# Patient Record
Sex: FEMALE | Race: WHITE
[De-identification: names, ages, dates, MRNs, and addresses within clinical notes are randomized per-mention and may not be internally consistent; named-entity substitution may affect disease eponyms.]

---

## 2018-10-18 ENCOUNTER — HOSPITAL ENCOUNTER (EMERGENCY)
Dept: HOSPITAL 44 - ED | Age: 38
Discharge: ANOTHER HOSPITAL | End: 2018-10-18
Payer: COMMERCIAL

## 2018-10-18 DIAGNOSIS — N92.4: Primary | ICD-10-CM

## 2018-10-18 LAB
BASOPHILS NFR BLD: 0.4 % (ref 0–1.5)
EGFR (NON-AFRICAN): > 60
EOSINOPHIL NFR BLD: 1.9 % (ref 0–6.8)
MCH RBC QN AUTO: 19.6 PG (ref 28–34)
MCV RBC AUTO: 64 FL (ref 80–100)
MONOCYTES %: 6.9 % (ref 0–11)
NEUTROPHILS #: 6.8 # K/UL (ref 1.4–7.7)

## 2018-10-18 PROCEDURE — S1016 NON-PVC INTRAVENOUS ADMINIST: HCPCS

## 2018-10-18 PROCEDURE — 85610 PROTHROMBIN TIME: CPT

## 2018-10-18 PROCEDURE — 96376 TX/PRO/DX INJ SAME DRUG ADON: CPT

## 2018-10-18 PROCEDURE — 84703 CHORIONIC GONADOTROPIN ASSAY: CPT

## 2018-10-18 PROCEDURE — 85730 THROMBOPLASTIN TIME PARTIAL: CPT

## 2018-10-18 PROCEDURE — 96365 THER/PROPH/DIAG IV INF INIT: CPT

## 2018-10-18 PROCEDURE — 96375 TX/PRO/DX INJ NEW DRUG ADDON: CPT

## 2018-10-18 PROCEDURE — 85025 COMPLETE CBC W/AUTO DIFF WBC: CPT

## 2018-10-18 PROCEDURE — 80053 COMPREHEN METABOLIC PANEL: CPT

## 2018-10-18 NOTE — ED PHYSICIAN DOCUMENTATION
Female Urogenital Problems





- HISTORIAN


Historian: patient





- HPI


Stated Complaint: Vaginal bleeding


Chief Complaint: Female Urogenital Problems


Onset: hours (worse past 4 hours), days ago


Severity: severe


Further Comments: yes (38 year old female patient presents with complaint of 

vaginal bleeding, patient reports passing handful clots PTA, has changed her 4oz

menstrual cup 10-12 times since .  C/O dizziness when standing.)





- Vaginal Bleeding


Abnormal Bleeding Started: 10/18/18


Compared to Menstrual Periods: heavier


LNMP (Last Known Menstrual Period): 10/08/18


: 1


Para: 1


Contraceptive: none





- ROS


CONST: none


GI/: nausea.  denies: vomiting, diarrhea


CVS/RESP: none


EYES/ENT: none


NEURO/PSYCH: none


MS/SKIN/LYMPH: none





- PAST HX


Past History: endometriosis


Allergies/Adverse Reactions: 


                                    Allergies











Allergy/AdvReac Type Severity Reaction Status Date / Time


 


No Known Allergies Allergy   Unverified 10/18/18 21:48














Home Medications: 


                                Ambulatory Orders











 Medication  Instructions  Recorded


 


NK  10/18/18














- SOCIAL HX


Smoking History: non-smoker





- FAMILY HX


Family History: denies: none





- VITAL SIGNS


Vital Signs: 





                                   Vital Signs











Temp Pulse Resp BP Pulse Ox


 


 98.6 F   93 H  18   124/69   100 


 


 10/18/18 20:20  10/18/18 20:20  10/18/18 20:20  10/18/18 20:20  10/18/18 20:20














- REVIEWED ASSESSMENTS


Nursing Assessment  Reviewed: Yes


Vitals Reviewed: Yes





Progress





- Consult/PCP


Time Called: 23:35


Consult/PCP: OB/GYN


Consult Reason/Comments: Patient accepted by Dr Morales





ED Results Lab/Radiology





- Lab Results


Lab Results: 





                                   Lab Results











  10/18/18 10/18/18 10/18/18





  22:40 21:47 21:47


 


WBC      10.70 K/ul K/ul





     (4.00-12.00) 


 


RBC      3.83 M/ul L M/ul





     (3.90-5.20) 


 


Hgb      7.5 g/dL L g/dL





     (12.0-16.0) 


 


Hct      24.4 % L %





     (34.5-46.5) 


 


MCV      64.0 fl L fl





     (80.0-100.0) 


 


MCH      19.6 pg L pg





     (28.0-34.0) 


 


MCHC      30.8 g/dL g/dL





     (30.0-36.0) 


 


RDW      18.1 % H %





     (11.3-14.3) 


 


Plt Count      433 K/mm3 H K/mm3





     (130-400) 


 


Neut % (Auto)      63.0 % %





     (39.0-79.0) 


 


Lymph % (Auto)      27.8 % %





     (16.0-50.0) 


 


Mono % (Auto)      6.9 % %





     (0.0-11.0) 


 


Eos % (Auto)      1.9 % %





     (0.0-6.8) 


 


Baso % (Auto)      0.4 





     (0.0-1.5) 


 


Neut # (Auto)      6.8 # k/uL # k/uL





     (1.4-7.7) 


 


Lymph # (Auto)      3.0 # k/uL # k/uL





     (0.6-4.0) 


 


Mono # (Auto)      0.7 # k/uL # k/uL





     (0.0-0.9) 


 


Eos # (Auto)      0.2 # k/uL # k/uL





     (0.0-0.6) 


 


Baso # (Auto)      0.0 # k/uL # k/uL





     (0.0-0.5) 


 


Sodium    138 mmol/L mmol/L  





    (136-145)  


 


Potassium    3.9 mmol/L mmol/L  





    (3.5-5.1)  


 


Chloride    104 mmol/L mmol/L  





    ()  


 


Carbon Dioxide    22 mmol/L mmol/L  





    (22-30)  


 


BUN    15 mg/dL mg/dL  





    (7-17)  


 


Creatinine    0.70 mg/dL mg/dL  





    (0.52-1.04)  


 


Estimated Creat Clear    190   





    


 


Est GFR ( Amer)    > 60   





   (60 - ) 


 


Est GFR (Non-Af Amer)    > 60   





   (60 - ) 


 


Glucose    99 mg/dL mg/dL  





    ()  


 


Calcium    8.1 mg/dL L mg/dL  





    (8.4-10.2)  


 


Total Bilirubin    0.4 mg/dL mg/dL  





    (0.2-1.3)  


 


AST    39 U/L U/L  





    (15-46)  


 


ALT    39 U/L U/L  





    (13-69)  


 


Alkaline Phosphatase    43 U/L U/L  





    ()  


 


Total Protein    7.0 g/dL g/dL  





    (6.3-8.2)  


 


Albumin    3.8 g/dL g/dL  





    (3.5-5.0)  


 


Serum HCG, Qual  Negative     





   (NEGATIVE)   














- Orders


Orders: 





                                    ED Orders











 Category Date Time Status


 


 Place IV Lock 1T Care  10/18/18 20:56 Active


 


 CBC/PLATELET/DIFF Stat Lab  10/18/18 21:47 Completed


 


 CMP Stat Lab  10/18/18 21:47 Completed


 


 PT-INR Stat Lab  10/18/18 23:01 Received


 


 PTT Stat Lab  10/18/18 23:01 Received


 


 SERUM HCG Stat Lab  10/18/18 22:40 Completed


 


 0.9 % Sodium Chloride [Normal Saline] 1,000 ml Med  10/18/18 20:56 Discontinued





 IV NOW   


 


 0.9 % Sodium Chloride [Normal Saline] 1,000 ml Med  10/18/18 23:31 Ordered





 IV NOW   


 


 Ondansetron HCl/Pf [Zofran 4 mg/2 ml] Med  10/18/18 21:47 Discontinued





 4 mg IVP NOW ONE   


 


 Ondansetron HCl/Pf [Zofran 4 mg/2 ml] Med  10/18/18 23:31 Once





 4 mg IVP NOW ONE   


 


 Promethazine HCl [Phenergan] Med  10/18/18 23:30 Stop Req





 25 mg IM NOW ONE   














Female Urogenital Problems





- EXAM


General Appearance: anxious


EENT: eye inspection normal, BHUPENDRA


Respiratory: no resp. distress, breath sounds nml


CVS: reg rate & rhythm, heart sounds normal, equal pulses, no murmur, no gallop,

 PMI nml, no JVD, no friction rub, 24


Abdomen: soft, non-tender, no organomegaly, no distention, nml bowel sounds


Pelvic: external exam nml, active bleeding, blood in vaginal vault, blood clots 

in vault.  No: speculum exam nml, herpes-like ulcerations, vaginal discharge, 

cerv.motion tenderness, cervical dilation


Skin: color nml, no rash, warm,dry


Extremities: non-tender, normal range of motion, no evidence of injury, no 

edema, J, NP


Neuro: oriented X3, CN's nml as tested, motor nml, sensation nml, mood/affect 

nml





Discharge


Clincal Impression: 


Menorrhagia


Qualifiers:


 Menorrahagia type: with onset of menstrual periods Qualified Code(s): N92.2 - 

Excessive menstruation at puberty





Referrals: 


Sonia Lechuga PRN [Primary Care Provider] - 2 Days


Condition: Stable


Disposition: 02 XFER [facility]M HOSP


Decision to Admit: NO


Decision Time: 23:43

## 2018-10-19 VITALS — DIASTOLIC BLOOD PRESSURE: 75 MMHG | SYSTOLIC BLOOD PRESSURE: 132 MMHG
